# Patient Record
Sex: FEMALE | Employment: PART TIME | URBAN - METROPOLITAN AREA
[De-identification: names, ages, dates, MRNs, and addresses within clinical notes are randomized per-mention and may not be internally consistent; named-entity substitution may affect disease eponyms.]

---

## 2022-04-14 ENCOUNTER — TRANSCRIBE ORDER (OUTPATIENT)
Dept: SCHEDULING | Age: 27
End: 2022-04-14

## 2022-04-14 DIAGNOSIS — R22.9 SUBCUTANEOUS NODULE: Primary | ICD-10-CM

## 2022-04-15 ENCOUNTER — TRANSCRIBE ORDER (OUTPATIENT)
Dept: SCHEDULING | Age: 27
End: 2022-04-15

## 2022-04-15 DIAGNOSIS — R22.9 SUBCUTANEOUS NODULE: Primary | ICD-10-CM

## 2022-04-26 ENCOUNTER — HOSPITAL ENCOUNTER (OUTPATIENT)
Dept: ULTRASOUND IMAGING | Age: 27
Discharge: HOME | End: 2022-04-26
Attending: DERMATOLOGY
Payer: MEDICAID

## 2022-04-26 DIAGNOSIS — R22.9 SUBCUTANEOUS NODULE: ICD-10-CM

## 2022-04-26 PROCEDURE — 76882 US LMTD JT/FCL EVL NVASC XTR: CPT

## 2022-05-31 RX ORDER — SPIRONOLACTONE AND HYDROCHLOROTHIAZIDE 25; 25 MG/1; MG/1
1 TABLET ORAL DAILY
COMMUNITY
Start: 2022-02-01 | End: 2022-06-02

## 2022-05-31 RX ORDER — DEXTROAMPHETAMINE SACCHARATE, AMPHETAMINE ASPARTATE MONOHYDRATE, DEXTROAMPHETAMINE SULFATE AND AMPHETAMINE SULFATE 2.5; 2.5; 2.5; 2.5 MG/1; MG/1; MG/1; MG/1
10 CAPSULE, EXTENDED RELEASE ORAL EVERY MORNING
COMMUNITY
Start: 2022-04-06

## 2022-05-31 NOTE — PROGRESS NOTES
Subjective:     Chief Complaint   Patient presents with   [address] is a 27 y.o. F.  She has no significant past medical history outside of ADHD, acne, and use of weight loss medications. She comes in today for establishment. She says that she had been inspired to come in as her family have been recently dealing with numerous health issues. She herself is actually been feeling fine and has had no significant interval changes to her health over the past year. She continues to use Adderall XR 10 mg daily for her ADHD, as prescribed to her by her psychiatrist.  In addition, she continues on spironolactone, as prescribed to her by her dermatologist for her history of acne. Finally, she had previously been using phentermine/topiramate for her weight loss, although the phentermine had been discontinued when she started the Adderall, and she continues on topiramate instead without sedation or other problems. She gets the topiramate from a specialist.  Over the past year, her weight has been stable, and her medications are felt by her to be effective for controlling her symptoms. She had an IUD placed last month, and reports being up-to-date with regard to her Pap smear. Her review of systems is otherwise negative. Routine Healthcare Maintenance issues are reviewed and discussed with the patient as noted below. Orders to update gaps in healthcare maintenance were placed as noted below in the Assessment and Plan, where applicable. Past Medical History:  Past Medical History:   Diagnosis Date    ADHD     Anxiety     Eczema     Long-term current use of stimulant        Past Surgical Histor:  No past surgical history on file.     Allergies:  No Known Allergies    Medications:  Current Outpatient Medications   Medication Sig Dispense Refill    Dapsone 5 % gel dapsone 5 % topical gel      Junel 1/20, 21, 1-20 mg-mcg tablet TAKE 1 TABLET BY MOUTH EVERY DAY SKIPPING PLACEBO PILLS FOR CONTINUOUS HORMONAL DOSING      levonorgestreL (Kyleena) 17.5 mcg/24 hrs (5 yrs) 19.5 mg IUD IUD Kyleena 17.5 mcg/24 hrs (5yrs) 19.5mg intrauterine device   Take 1 device by intrauterine route.  topiramate (TOPAMAX) 25 mg tablet       tretinoin (RETIN-A) 0.05 % topical cream APPLY TO AFFECTED AREA OF THE FACE AT BEDTIME      spironolactone (ALDACTONE) 25 mg tablet Take 25 mg by mouth daily.  amphetamine-dextroamphetamine XR (ADDERALL XR) 10 mg XR capsule Take 10 mg by mouth Every morning. Social History:  Social History     Socioeconomic History    Marital status: SINGLE   Tobacco Use    Smoking status: Never Smoker    Smokeless tobacco: Never Used   Substance and Sexual Activity    Alcohol use: Yes     Comment: socially    Drug use: Never    Sexual activity: Yes     Social Determinants of Health     Physical Activity: Insufficiently Active    Days of Exercise per Week: 4 days    Minutes of Exercise per Session: 20 min       Family History:  No family history on file. Immunizations:  Immunization History   Administered Date(s) Administered    COVID-19, Moderna Booster, PF, 0.25mL Dose 09/07/2021, 10/02/2021    COVID-19, Moderna, Primary or Immunocompromised Series, MRNA, PF, 100mcg/0.5mL 03/23/2021, 04/21/2021    Hep B Vaccine (Adult) 01/11/2022    Influenza Vaccine (Quad) Mdck Pf (>2 Yrs Flucelvax QUAD [id number]) 10/14/2021    TB Skin Test (PPD) Intradermal 12/11/2021    Tdap 12/11/2021        Healthcare Maintenance:  Health Maintenance   Topic Date Due    Hepatitis C Screening  Never done    Depression Screen  Never done    HPV Age 9Y-34Y (1 - 2-dose series) Never done    Pap Smear  Never done    DTaP/Tdap/Td series (2 - Td or Tdap) 12/11/2031    Flu Vaccine  Completed    COVID-19 Vaccine  Completed    Pneumococcal 0-64 years  Aged Out        Review of Systems:  ROS:  Review of Systems   Constitutional: Negative. HENT: Negative. Eyes: Negative.     Respiratory: Negative. Cardiovascular: Negative. Gastrointestinal: Negative. Genitourinary: Negative. Musculoskeletal: Negative. Skin: Negative. Neurological: Negative. Endo/Heme/Allergies: Negative. Psychiatric/Behavioral: Negative. ROS otherwise negative      Objective:     Vital Signs:  Visit Vitals  BP 98/65 (BP 1 Location: Left upper arm, BP Patient Position: Sitting, BP Cuff Size: Adult)   Pulse 68   Temp 98.7 °F (37.1 °C) (Oral)   Ht 5' 6\" (1.676 m)   Wt 159 lb 4.8 oz (72.3 kg)   LMP 05/21/2022   SpO2 98%   BMI 25.71 kg/m²       BMI:  Body mass index is 25.71 kg/m². Physical Examination:  Physical Exam  Constitutional:       Appearance: Normal appearance. She is normal weight. HENT:      Head: Normocephalic and atraumatic. Right Ear: External ear normal.      Left Ear: External ear normal.      Nose: Nose normal.      Mouth/Throat:      Mouth: Mucous membranes are moist.      Pharynx: Oropharynx is clear. No oropharyngeal exudate or posterior oropharyngeal erythema. Cardiovascular:      Rate and Rhythm: Normal rate and regular rhythm. Pulses: Normal pulses. Heart sounds: Normal heart sounds. No murmur heard. No friction rub. No gallop. Pulmonary:      Effort: Pulmonary effort is normal. No respiratory distress. Breath sounds: Normal breath sounds. No wheezing, rhonchi or rales. Abdominal:      General: Abdomen is flat. Bowel sounds are normal. There is no distension. Palpations: Abdomen is soft. Tenderness: There is no abdominal tenderness. There is no guarding. Musculoskeletal:         General: No swelling, tenderness or deformity. Normal range of motion. Cervical back: Normal range of motion and neck supple. No rigidity or tenderness. Skin:     General: Skin is warm and dry. Findings: No erythema, lesion or rash. Neurological:      General: No focal deficit present.       Mental Status: She is alert and oriented to person, place, and time. Mental status is at baseline. Sensory: No sensory deficit. Motor: No weakness. Gait: Gait normal.      Deep Tendon Reflexes: Reflexes normal.   Psychiatric:         Mood and Affect: Mood normal.         Behavior: Behavior normal.         Judgment: Judgment normal.          Physical exam otherwise negative    Diagnostic Testing:    Laboratory Studies:  No visits with results within 1 Year(s) from this visit. Latest known visit with results is:   No results found for any previous visit. Radiographic Studies:  XR Results (most recent):  No results found for this or any previous visit. ROHIT Results (most recent):  No results found for this or any previous visit. CT Results (most recent):  No results found for this or any previous visit. DEXA Results (most recent):  No results found for this or any previous visit. MRI Results (most recent):  No results found for this or any previous visit. Assessment/Plan:       ICD-10-CM ICD-9-CM    1. Routine adult health maintenance  Z00.00 V70.0 HEMOGLOBIN A1C WITH EAG      CBC WITH AUTOMATED DIFF      METABOLIC PANEL, COMPREHENSIVE      LIPID PANEL   2. Screening for viral disease  Z11.59 V73.99 HIV 1/2 AG/AB, 4TH GENERATION,W RFLX CONFIRM      HEPATITIS C AB   3. ADHD (attention deficit hyperactivity disorder), combined type  F90.2 314.01    4. Weight loss due to medication  R63.4 783.21     T50.905A E947.9    5. Acne, unspecified acne type  L70.9 706.1           Healthcare Maintenance:  - Preventive measures are reviewed as per above  - Up to date on routine interventions except as noted above  - Orders placed to update gaps as noted  - Notes: Fasting laboratory studies ordered. HCV, HIV ordered. She is up-to-date with regard to her Pap smear by her report. Acne:  - Using spironolactone. Usually has her electrolytes checked by her dermatologist.  Reassessing renal function as noted above.  Continue followup with Dermatology. ADHD:  - On Adderall. Continue follow-up with psychiatry. Weight loss medication use:  - On topiramate. Checking LFTs, CBC. Continuing otherwise with current medication regimen as per specialty prescription. Follow-up and Dispositions    · Return in about 1 year (around 6/2/2023). Andrew Chang MD    Please note that this dictation was completed with [employer], the computer voice recognition software. Quite often unanticipated grammatical, syntax, homophones, and other interpretive errors are inadvertently transcribed by the computer software. Please disregard these errors. Please excuse any errors that have escaped final proofreading.

## 2022-06-02 ENCOUNTER — OFFICE VISIT (OUTPATIENT)
Dept: INTERNAL MEDICINE CLINIC | Age: 27
End: 2022-06-02
Payer: MEDICAID

## 2022-06-02 VITALS
OXYGEN SATURATION: 98 % | HEART RATE: 68 BPM | HEIGHT: 66 IN | SYSTOLIC BLOOD PRESSURE: 98 MMHG | DIASTOLIC BLOOD PRESSURE: 65 MMHG | WEIGHT: 159.3 LBS | BODY MASS INDEX: 25.6 KG/M2 | TEMPERATURE: 98.7 F

## 2022-06-02 DIAGNOSIS — Z00.00 ROUTINE ADULT HEALTH MAINTENANCE: Primary | ICD-10-CM

## 2022-06-02 DIAGNOSIS — L70.9 ACNE, UNSPECIFIED ACNE TYPE: ICD-10-CM

## 2022-06-02 DIAGNOSIS — T50.905A WEIGHT LOSS DUE TO MEDICATION: ICD-10-CM

## 2022-06-02 DIAGNOSIS — F90.2 ADHD (ATTENTION DEFICIT HYPERACTIVITY DISORDER), COMBINED TYPE: ICD-10-CM

## 2022-06-02 DIAGNOSIS — Z11.59 SCREENING FOR VIRAL DISEASE: ICD-10-CM

## 2022-06-02 DIAGNOSIS — R63.4 WEIGHT LOSS DUE TO MEDICATION: ICD-10-CM

## 2022-06-02 PROBLEM — M67.431 GANGLION CYST OF DORSUM OF RIGHT WRIST: Status: ACTIVE | Noted: 2022-06-02

## 2022-06-02 PROBLEM — D17.22 LIPOMA OF LEFT AXILLA: Status: ACTIVE | Noted: 2022-06-02

## 2022-06-02 PROCEDURE — 99385 PREV VISIT NEW AGE 18-39: CPT | Performed by: INTERNAL MEDICINE

## 2022-06-02 RX ORDER — DAPSONE 50 MG/G
GEL TOPICAL
COMMUNITY

## 2022-06-02 RX ORDER — TRETINOIN 0.5 MG/G
CREAM TOPICAL
COMMUNITY
Start: 2022-05-13

## 2022-06-02 RX ORDER — TOPIRAMATE 25 MG/1
TABLET ORAL
COMMUNITY
Start: 2021-08-01

## 2022-06-02 RX ORDER — NORETHINDRONE ACETATE AND ETHINYL ESTRADIOL 1; 20 MG/1; UG/1
TABLET ORAL
COMMUNITY
Start: 2022-03-25

## 2022-06-02 RX ORDER — LEVONORGESTREL 19.5 MG/1
INTRAUTERINE DEVICE INTRAUTERINE
COMMUNITY

## 2022-06-02 RX ORDER — SPIRONOLACTONE 25 MG/1
25 TABLET ORAL DAILY
COMMUNITY
Start: 2022-02-13

## 2022-06-02 RX ORDER — PHENTERMINE HYDROCHLORIDE 37.5 MG/1
TABLET ORAL
COMMUNITY
Start: 2021-09-28 | End: 2022-06-02

## 2022-06-02 NOTE — PATIENT INSTRUCTIONS
Learning About the [facility] Diet  What is the Mediterranean diet? The Mediterranean diet is a style of eating rather than a diet plan. It features foods eaten in [place], [place], [place] and [place], and other countries along the [facility]. It emphasizes eating foods like fish, fruits, vegetables, beans, high-fiber breads and whole grains, nuts, and olive oil. This style of eating includes limited red meat, cheese, and sweets. Why choose the Mediterranean diet? A Mediterranean-style diet may improve heart health. It contains more fat than other heart-healthy diets. But the fats are mainly from nuts, unsaturated oils (such as fish oils and olive oil), and certain nut or seed oils (such as canola, soybean, or flaxseed oil). These fats may help protect the heart and blood vessels. How can you get started on the Mediterranean diet? Here are some things you can do to switch to a more Mediterranean way of eating. What to eat  · Eat a variety of fruits and vegetables each day, such as grapes, blueberries, tomatoes, broccoli, peppers, figs, olives, spinach, eggplant, beans, lentils, and chickpeas. · Eat a variety of whole-grain foods each day, such as oats, brown rice, and whole wheat bread, pasta, and couscous. · Eat fish at least 2 times a week. Try tuna, salmon, mackerel, lake trout, herring, or sardines. · Eat moderate amounts of low-fat dairy products, such as milk, cheese, or yogurt. · Eat moderate amounts of poultry and eggs. · Choose healthy (unsaturated) fats, such as nuts, olive oil, and certain nut or seed oils like canola, soybean, and flaxseed. · Limit unhealthy (saturated) fats, such as butter, palm oil, and coconut oil. And limit fats found in animal products, such as meat and dairy products made with whole milk. Try to eat red meat only a few times a month in very small amounts. · Limit sweets and desserts to only a few times a week.  This includes sugar-sweetened drinks like soda. The Mediterranean diet may also include red wine with your meal1 glass each day for women and up to 2 glasses a day for men. Tips for eating at home  · Use herbs, spices, garlic, lemon zest, and citrus juice instead of salt to add flavor to foods. · Add avocado slices to your sandwich instead of quiros. · Have fish for lunch or dinner instead of red meat. Brush the fish with olive oil, and broil or grill it. · Sprinkle your salad with seeds or nuts instead of cheese. · Cook with olive or canola oil instead of butter or oils that are high in saturated fat. · Switch from 2% milk or whole milk to 1% or fat-free milk. · Dip raw vegetables in a vinaigrette dressing or hummus instead of dips made from mayonnaise or sour cream.  · Have a piece of fruit for dessert instead of a piece of cake. Try baked apples, or have some dried fruit. Tips for eating out  · Try broiled, grilled, baked, or poached fish instead of having it fried or breaded. · Ask your  to have your meals prepared with olive oil instead of butter. · Order dishes made with marinara sauce or sauces made from olive oil. Avoid sauces made from cream or mayonnaise. · Choose whole-grain breads, whole wheat pasta and pizza crust, brown rice, beans, and lentils. · Cut back on butter or margarine on bread. Instead, you can dip your bread in a small amount of olive oil. · Ask for a side salad or grilled vegetables instead of french fries or chips. Where can you learn more? Go to http://bisi-myrna.info/  Enter O407 in the search box to learn more about \"Learning About the Mediterranean Diet. \"  Current as of: September 8, 2021               Content Version: 13.2  © 9404-1518 [employer]. Care instructions adapted under license by [employer] (which disclaims liability or warranty for this information).  If you have questions about a medical condition or this instruction, always ask your healthcare professional. [address] any warranty or liability for your use of this information.

## 2022-06-02 NOTE — PROGRESS NOTES
Chief Complaint   Patient presents with   [facility]       1. \"Have you been to the ER, urgent care clinic since your last visit? Hospitalized since your last visit? \" No    2. \"Have you seen or consulted any other health care providers outside of the [address] [place] [address] since your last visit? \" No     3. For patients aged 39-70: Has the patient had a colonoscopy / FIT/ Cologuard? No      If the patient is female:    4. For patients aged 41-77: Has the patient had a mammogram within the past 2 years? No      5. For patients aged 21-65: Has the patient had a pap smear?  No

## 2022-06-16 ENCOUNTER — LAB ONLY (OUTPATIENT)
Dept: INTERNAL MEDICINE CLINIC | Age: 27
End: 2022-06-16

## 2022-06-16 DIAGNOSIS — Z00.00 ROUTINE ADULT HEALTH MAINTENANCE: ICD-10-CM

## 2022-06-16 DIAGNOSIS — Z11.59 SCREENING FOR VIRAL DISEASE: ICD-10-CM

## 2022-06-17 LAB
ALBUMIN SERPL-MCNC: 4.6 G/DL (ref 3.9–5)
ALBUMIN/GLOB SERPL: 1.8 {RATIO} (ref 1.2–2.2)
ALP SERPL-CCNC: 41 IU/L (ref 44–121)
ALT SERPL-CCNC: 10 IU/L (ref 0–32)
AST SERPL-CCNC: 15 IU/L (ref 0–40)
BASOPHILS # BLD AUTO: 0 X10E3/UL (ref 0–0.2)
BASOPHILS NFR BLD AUTO: 1 %
BILIRUB SERPL-MCNC: 0.4 MG/DL (ref 0–1.2)
BUN SERPL-MCNC: 13 MG/DL (ref 6–20)
BUN/CREAT SERPL: 17 (ref 9–23)
CALCIUM SERPL-MCNC: 9.7 MG/DL (ref 8.7–10.2)
CHLORIDE SERPL-SCNC: 106 MMOL/L (ref 96–106)
CHOLEST SERPL-MCNC: 168 MG/DL (ref 100–199)
CO2 SERPL-SCNC: 24 MMOL/L (ref 20–29)
CREAT SERPL-MCNC: 0.77 MG/DL (ref 0.57–1)
EGFR: 110 ML/MIN/1.73
EOSINOPHIL # BLD AUTO: 0.1 X10E3/UL (ref 0–0.4)
EOSINOPHIL NFR BLD AUTO: 2 %
ERYTHROCYTE [DISTWIDTH] IN BLOOD BY AUTOMATED COUNT: 12.2 % (ref 11.7–15.4)
EST. AVERAGE GLUCOSE BLD GHB EST-MCNC: 97 MG/DL
GLOBULIN SER CALC-MCNC: 2.5 G/DL (ref 1.5–4.5)
GLUCOSE SERPL-MCNC: 91 MG/DL (ref 65–99)
HBA1C MFR BLD: 5 % (ref 4.8–5.6)
HCT VFR BLD AUTO: 39.9 % (ref 34–46.6)
HCV AB S/CO SERPL IA: <0.1 S/CO RATIO (ref 0–0.9)
HDLC SERPL-MCNC: 59 MG/DL
HGB BLD-MCNC: 12.4 G/DL (ref 11.1–15.9)
HIV 1+2 AB+HIV1 P24 AG SERPL QL IA: NON REACTIVE
IMM GRANULOCYTES # BLD AUTO: 0 X10E3/UL (ref 0–0.1)
IMM GRANULOCYTES NFR BLD AUTO: 0 %
LDLC SERPL CALC-MCNC: 94 MG/DL (ref 0–99)
LYMPHOCYTES # BLD AUTO: 2.1 X10E3/UL (ref 0.7–3.1)
LYMPHOCYTES NFR BLD AUTO: 38 %
MCH RBC QN AUTO: 25.1 PG (ref 26.6–33)
MCHC RBC AUTO-ENTMCNC: 31.1 G/DL (ref 31.5–35.7)
MCV RBC AUTO: 81 FL (ref 79–97)
MONOCYTES # BLD AUTO: 0.4 X10E3/UL (ref 0.1–0.9)
MONOCYTES NFR BLD AUTO: 7 %
NEUTROPHILS # BLD AUTO: 2.8 X10E3/UL (ref 1.4–7)
NEUTROPHILS NFR BLD AUTO: 52 %
PLATELET # BLD AUTO: 230 X10E3/UL (ref 150–450)
POTASSIUM SERPL-SCNC: 5.5 MMOL/L (ref 3.5–5.2)
PROT SERPL-MCNC: 7.1 G/DL (ref 6–8.5)
RBC # BLD AUTO: 4.94 X10E6/UL (ref 3.77–5.28)
SODIUM SERPL-SCNC: 142 MMOL/L (ref 134–144)
TRIGL SERPL-MCNC: 80 MG/DL (ref 0–149)
VLDLC SERPL CALC-MCNC: 15 MG/DL (ref 5–40)
WBC # BLD AUTO: 5.4 X10E3/UL (ref 3.4–10.8)

## 2023-03-28 ENCOUNTER — OFFICE VISIT (OUTPATIENT)
Dept: INTERNAL MEDICINE CLINIC | Age: 28
End: 2023-03-28
Payer: MEDICAID

## 2023-03-28 VITALS
WEIGHT: 143 LBS | RESPIRATION RATE: 18 BRPM | DIASTOLIC BLOOD PRESSURE: 80 MMHG | SYSTOLIC BLOOD PRESSURE: 118 MMHG | HEART RATE: 73 BPM | BODY MASS INDEX: 22.98 KG/M2 | HEIGHT: 66 IN | OXYGEN SATURATION: 100 %

## 2023-03-28 DIAGNOSIS — K58.2 IRRITABLE BOWEL SYNDROME WITH BOTH CONSTIPATION AND DIARRHEA: Primary | ICD-10-CM

## 2023-03-28 DIAGNOSIS — Z51.81 THERAPEUTIC DRUG MONITORING: ICD-10-CM

## 2023-03-28 DIAGNOSIS — L70.0 ACNE VULGARIS: ICD-10-CM

## 2023-03-28 DIAGNOSIS — F90.2 ADHD (ATTENTION DEFICIT HYPERACTIVITY DISORDER), COMBINED TYPE: ICD-10-CM

## 2023-03-28 DIAGNOSIS — E87.5 HYPERKALEMIA: ICD-10-CM

## 2023-03-28 PROCEDURE — 99213 OFFICE O/P EST LOW 20 MIN: CPT | Performed by: INTERNAL MEDICINE

## 2023-03-28 RX ORDER — DEXTROAMPHETAMINE SACCHARATE, AMPHETAMINE ASPARTATE MONOHYDRATE, DEXTROAMPHETAMINE SULFATE AND AMPHETAMINE SULFATE 3.75; 3.75; 3.75; 3.75 MG/1; MG/1; MG/1; MG/1
CAPSULE, EXTENDED RELEASE ORAL
COMMUNITY
Start: 2023-02-15

## 2023-03-28 NOTE — PROGRESS NOTES
Chief Complaint   Patient presents with    Shoulder Injury     Numbness on hand and IBS     Visit Vitals  /80 (BP 1 Location: Left upper arm, BP Patient Position: Sitting, BP Cuff Size: Adult)   Pulse 73   Resp 18   Ht 5' 6\" (1.676 m)   Wt 143 lb (64.9 kg)   SpO2 100%   BMI 23.08 kg/m²     1. \"Have you been to the ER, urgent care clinic since your last visit? Hospitalized since your last visit? \" No    2. \"Have you seen or consulted any other health care providers outside of the [address] [place] [address] since your last visit? \" No     3. For patients aged 39-70: Has the patient had a colonoscopy / FIT/ Cologuard? No      If the patient is female:    4. For patients aged 41-77: Has the patient had a mammogram within the past 2 years? No      5. For patients aged 21-65: Has the patient had a pap smear?  No

## 2023-03-28 NOTE — PROGRESS NOTES
ICD-10-CM ICD-9-CM    1. Irritable bowel syndrome with both constipation and diarrhea  K58.2 564.1       2. ADHD (attention deficit hyperactivity disorder), combined type  F90.2 314.01       3. Hyperkalemia  P83.9 340.5 METABOLIC PANEL, COMPREHENSIVE      CANCELED: RENAL FUNCTION PANEL      4. Therapeutic drug monitoring  D39.39 H66.77 METABOLIC PANEL, COMPREHENSIVE      5. Acne vulgaris  L70.0 706.1                Subjective:     Chief Complaint   Patient presents with    Shoulder Injury     Numbness on hand and IBS       Ozzie Rios is a 24 y.o. F.  she  has a past medical history of Acne vulgaris, ADHD, Anxiety, Eczema, Irritable bowel syndrome, and Long-term current use of stimulant. her last appointment in this clinic was 6/2/2022 . I reviewed and updated the medical record. This is a very pleasant 46-year-old white female with past medical history of acne vulgaris who presents today for routine follow-up. She reports feeling generally okay overall today but has some questions. Irritable bowel syndrome: Over the past year or so, the patient has had intermittent abdominal discomfort associate with bloating, that is typically relieved with flatus and with bowel movements. She describes intermittent constipation with this as well. 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 She denies having had any hematochezia or melena during this time. She has lost weight, but this has been intentional, as she continues on weight loss medications. She denies having had any fevers or chills, or any further constitutional or systemic complaints. She has never had any family history of inflammatory bowel disease, or any other family history of colon cancer. She notes that she had gone on a low FODMAP diet inadvertently, and had good results with this, with subsequent improvement in her symptoms.   She wishes to know today if additional evaluation is necessary. She otherwise has been feeling fine, noting that the abdominal discomfort that she had before is largely resolved. Sinus arrhythmia: She reports that her father recently had been diagnosed with atrial fibrillation, and wonders if she might be at risk for this as she had a sinus arrhythmia on previous examination. She has had no palpitations, shortness of breath, or any further cardiopulmonary concerns. No changes in exercise tolerance overall. No other family history of any cardiovascular or cerebrovascular disease. Neuropathy: The patient says that over the past 2 days, she has had a patch of numbness and diminished sensation on the back of her hand on the right side. She denies having had any antecedent injury to the hand, shoulder, elbow, or other problems. She has had no other numbness or tingling at other areas. Overall, she has maintained most sensation in the hand but says that \"things just feel off\" in the small area on the back of her hand but that this is improving on its own at this time. No other constitutional systemic complaints. Review of systems otherwise negative. Routine Healthcare Maintenance issues are reviewed and discussed with the patient as noted below. Orders to update gaps in healthcare maintenance were placed as noted below in the Assessment and Plan, where applicable. Past Medical History:  Past Medical History:   Diagnosis Date    Acne vulgaris     ADHD     Anxiety     Eczema     Irritable bowel syndrome     Long-term current use of stimulant        Past Surgical Histor:  History reviewed. No pertinent surgical history. Allergies:  No Known Allergies    Medications:  Current Outpatient Medications   Medication Sig Dispense Refill    amphetamine-dextroamphetamine XR (ADDERALL XR) 15 mg XR capsule TAKE 1 CAPSULE BY MOUTH ONCE DAILY IN THE MORNING DO NOT CRUSH OR CHEW      FIBER-CAPS, PSYLLIUM HUSK, PO Take  by mouth.       ASHWAGANDHA EXTRACT PO Take  by mouth. Dapsone 5 % gel dapsone 5 % topical gel      Junel 1/20, 21, 1-20 mg-mcg tablet TAKE 1 TABLET BY MOUTH EVERY DAY SKIPPING PLACEBO PILLS FOR CONTINUOUS HORMONAL DOSING      levonorgestreL (Kyleena) 17.5 mcg/24 hrs (5 yrs) 19.5 mg IUD IUD Kyleena 17.5 mcg/24 hrs (5yrs) 19.5mg intrauterine device   Take 1 device by intrauterine route. topiramate (TOPAMAX) 25 mg tablet       tretinoin (RETIN-A) 0.05 % topical cream APPLY TO AFFECTED AREA OF THE FACE AT BEDTIME      spironolactone (ALDACTONE) 25 mg tablet Take 25 mg by mouth daily. Social History:  Social History     Socioeconomic History    Marital status: SINGLE   Tobacco Use    Smoking status: Never    Smokeless tobacco: Never   Substance and Sexual Activity    Alcohol use: Yes     Comment: socially    Drug use: Never    Sexual activity: Yes     Social Determinants of Health     Physical Activity: Insufficiently Active    Days of Exercise per Week: 4 days    Minutes of Exercise per Session: 20 min       Family History:  History reviewed. No pertinent family history.     Immunizations:  Immunization History   Administered Date(s) Administered    COVID-19, MODERNA [place] border, Primary or Immunocompromised, (age 18y+), IM, 100 mcg/0.5mL 03/23/2021, 04/21/2021    COVID-19, MODERNA Booster BLUE border, (age 18y+), IM, 50mcg/0.25mL 09/07/2021, 10/02/2021    Hep B Vaccine (Adult) 01/11/2022    Influenza, FLUCELVAX, (age 10 mo+), MDCK, PF 10/14/2021    TB Skin Test (PPD) Intradermal 12/11/2021    Tdap 12/11/2021        Healthcare Maintenance:  Health Maintenance   Topic Date Due    Varicella Vaccine (1 of 2 - 2-dose childhood series) Never done    HPV Age 9Y-34Y (1 - 2-dose series) Never done    Pap Smear  Never done    COVID-19 Vaccine (5 - Booster for Moderna series) 11/27/2021    Flu Vaccine (1) 08/01/2022    Medicare Yearly Exam  03/27/2023    Depression Screen  06/02/2023    DTaP/Tdap/Td series (2 - Td or Tdap) 12/11/2031 Hepatitis C Screening  Completed    Pneumococcal 0-64 years  Aged Out        Review of Systems:  ROS:  Review of Systems   Constitutional: Negative. HENT: Negative. Eyes: Negative. Respiratory: Negative. Cardiovascular: Negative. Gastrointestinal: Negative. Genitourinary: Negative. Musculoskeletal: Negative. Skin: Negative. Neurological: Negative. Endo/Heme/Allergies: Negative. Psychiatric/Behavioral: Negative. ROS otherwise negative      Objective:     Vital Signs:  Visit Vitals  /80 (BP 1 Location: Left upper arm, BP Patient Position: Sitting, BP Cuff Size: Adult)   Pulse 73   Resp 18   Ht 5' 6\" (1.676 m)   Wt 143 lb (64.9 kg)   SpO2 100%   BMI 23.08 kg/m²       BMI:  Body mass index is 23.08 kg/m². Physical Examination:  Physical Exam  Constitutional:       Appearance: Normal appearance. She is normal weight. HENT:      Head: Normocephalic and atraumatic. Right Ear: External ear normal.      Left Ear: External ear normal.      Nose: Nose normal.      Mouth/Throat:      Mouth: Mucous membranes are moist.      Pharynx: Oropharynx is clear. No oropharyngeal exudate or posterior oropharyngeal erythema. Cardiovascular:      Rate and Rhythm: Normal rate and regular rhythm. Pulses: Normal pulses. Heart sounds: Normal heart sounds. No murmur heard. No friction rub. No gallop. Pulmonary:      Effort: Pulmonary effort is normal. No respiratory distress. Breath sounds: Normal breath sounds. No wheezing, rhonchi or rales. Abdominal:      General: Abdomen is flat. Bowel sounds are normal. There is no distension. Palpations: Abdomen is soft. Tenderness: There is no abdominal tenderness. There is no guarding. Musculoskeletal:         General: No swelling, tenderness or deformity. Normal range of motion. Cervical back: Normal range of motion and neck supple. No rigidity or tenderness. Skin:     General: Skin is warm and dry. Findings: No erythema, lesion or rash. Neurological:      General: No focal deficit present. Mental Status: She is alert and oriented to person, place, and time. Mental status is at baseline. Sensory: No sensory deficit. Motor: No weakness. Gait: Gait normal.      Deep Tendon Reflexes: Reflexes normal.   Psychiatric:         Mood and Affect: Mood normal.         Behavior: Behavior normal.         Judgment: Judgment normal.        Physical exam otherwise negative    Diagnostic Testing:    Laboratory Studies:  No visits with results within 6 Month(s) from this visit. Latest known visit with results is:   Lab Only on 06/16/2022   Component Date Value Ref Range Status    Hemoglobin A1c 06/16/2022 5.0  4.8 - 5.6 % Final    Comment:          Prediabetes: 5.7 - 6.4           Diabetes: >6.4           Glycemic control for adults with diabetes: <7.0      Estimated average glucose 06/16/2022 97  mg/dL Final    WBC 06/16/2022 5.4  3.4 - 10.8 x10E3/uL Final    RBC 06/16/2022 4.94  3.77 - 5.28 x10E6/uL Final    HGB 06/16/2022 12.4  11.1 - 15.9 g/dL Final    HCT 06/16/2022 39.9  34.0 - 46.6 % Final    MCV 06/16/2022 81  79 - 97 fL Final    MCH 06/16/2022 25.1 (A)  26.6 - 33.0 pg Final    MCHC 06/16/2022 31.1 (A)  31.5 - 35.7 g/dL Final    RDW 06/16/2022 12.2  11.7 - 15.4 % Final    PLATELET 66/37/5401 697  150 - 450 x10E3/uL Final    NEUTROPHILS 06/16/2022 52  Not Estab. % Final    Lymphocytes 06/16/2022 38  Not Estab. % Final    MONOCYTES 06/16/2022 7  Not Estab. % Final    EOSINOPHILS 06/16/2022 2  Not Estab. % Final    BASOPHILS 06/16/2022 1  Not Estab. % Final    ABS. NEUTROPHILS 06/16/2022 2.8  1.4 - 7.0 x10E3/uL Final    Abs Lymphocytes 06/16/2022 2.1  0.7 - 3.1 x10E3/uL Final    ABS. MONOCYTES 06/16/2022 0.4  0.1 - 0.9 x10E3/uL Final    ABS. EOSINOPHILS 06/16/2022 0.1  0.0 - 0.4 x10E3/uL Final    ABS. BASOPHILS 06/16/2022 0.0  0.0 - 0.2 x10E3/uL Final    IMMATURE GRANULOCYTES 06/16/2022 0  Not Estab. % Final    ABS. IMM. GRANS. 06/16/2022 0.0  0.0 - 0.1 x10E3/uL Final    Glucose 06/16/2022 91  65 - 99 mg/dL Final    BUN 06/16/2022 13  6 - 20 mg/dL Final    Creatinine 06/16/2022 0.77  0.57 - 1.00 mg/dL Final    eGFR 06/16/2022 110  >59 mL/min/1.73 Final    BUN/Creatinine ratio 06/16/2022 17  9 - 23 Final    Sodium 06/16/2022 142  134 - 144 mmol/L Final    Potassium 06/16/2022 5.5 (A)  3.5 - 5.2 mmol/L Final    Chloride 06/16/2022 106  96 - 106 mmol/L Final    CO2 06/16/2022 24  20 - 29 mmol/L Final    Calcium 06/16/2022 9.7  8.7 - 10.2 mg/dL Final    Protein, total 06/16/2022 7.1  6.0 - 8.5 g/dL Final    Albumin 06/16/2022 4.6  3.9 - 5.0 g/dL Final    GLOBULIN, TOTAL 06/16/2022 2.5  1.5 - 4.5 g/dL Final    A-G Ratio 06/16/2022 1.8  1.2 - 2.2 Final    Bilirubin, total 06/16/2022 0.4  0.0 - 1.2 mg/dL Final    Alk. phosphatase 06/16/2022 41 (A)  44 - 121 IU/L Final    AST (SGOT) 06/16/2022 15  0 - 40 IU/L Final    ALT (SGPT) 06/16/2022 10  0 - 32 IU/L Final    Cholesterol, total 06/16/2022 168  100 - 199 mg/dL Final    Triglyceride 06/16/2022 80  0 - 149 mg/dL Final    HDL Cholesterol 06/16/2022 59  >39 mg/dL Final    VLDL, calculated 06/16/2022 15  5 - 40 mg/dL Final    LDL, calculated 06/16/2022 94  0 - 99 mg/dL Final    HIV SCREEN 4TH GENERATION [facility] 06/16/2022 Non Reactive  Non Reactive Final    Comment: HIV Negative  HIV-1/HIV-2 antibodies and HIV-1 p24 antigen were NOT detected. There is no laboratory evidence of HIV infection. Hep C Virus Ab 06/16/2022 <0.1  0.0 - 0.9 s/co ratio Final    Comment:                                   Negative:     < 0.8                               Indeterminate: 0.8 - 0.9                                    Positive:     > 0.9   HCV antibody alone does not differentiate between   previous resolved infection and active infection.    The CDC and current clinical guidelines recommend   that a positive HCV antibody result be followed up   with an HCV RNA test to support the diagnosis of   acute HCV infection. [facility] offers Hepatitis C   Virus (HCV) RNA, Diagnosis, BESSIE ([id number]) and   Hepatitis C Virus (HCV) Antibody with reflex to   Quantitative Real-time PCR ([id number]). Radiographic Studies:  XR Results (most recent):  No results found for this or any previous visit. ROHIT Results (most recent):  No results found for this or any previous visit. CT Results (most recent):  No results found for this or any previous visit. DEXA Results (most recent):  No results found for this or any previous visit. MRI Results (most recent):  No results found for this or any previous visit. Assessment/Plan:       ICD-10-CM ICD-9-CM    1. Irritable bowel syndrome with both constipation and diarrhea  K58.2 564.1       2. ADHD (attention deficit hyperactivity disorder), combined type  F90.2 314.01       3. Hyperkalemia  D00.3 512.9 METABOLIC PANEL, COMPREHENSIVE      CANCELED: RENAL FUNCTION PANEL      4. Therapeutic drug monitoring  D03.97 L29.63 METABOLIC PANEL, COMPREHENSIVE      5. Acne vulgaris  L70.0 706.1              Irritable bowel syndrome:  - Suspected based upon history, physical examination today  - Advised low FODMAP diet, consider referral to gastroenterology should symptoms progress, no clinical suspicion at this point for inflammatory bowel disease, though low threshold for considering additional diagnostic evaluation with colonoscopy, laboratory studies should her symptoms worsen  - Return precautions discussed with patient who endorses agreement and understanding    ADHD:  - She continues to get Adderall at the direction of her psychiatrist.  No changes to medication regimen today as this otherwise appears to be effective for her. Ongoing use of weight loss medication:  - If she continues get topiramate at the direction of the weight loss center.   I advised the patient that her BMI today is normal, and that additional weight loss would probably not be advisable. She endorses agreement and understanding, but wishes to continue with topiramate as it appears to be helping her with her headaches. Monitoring labs ordered. I have reviewed the patient's medical history in detail and updated the computerized patient record. We had a prolonged discussion about these complex clinical issues and went over the various important aspects to consider. All questions were answered. Advised the patient to call back or return to office if symptoms do not improve, change in nature, or persist.     The patient was given an after visit summary or informed of MyChart Access which includes patient instructions, diagnoses, current medications, & vitals. she expressed understanding with the diagnosis and plan. Delgado Rivera MD    Please note that this dictation was completed with [employer], the computer voice recognition software. Quite often unanticipated grammatical, syntax, homophones, and other interpretive errors are inadvertently transcribed by the computer software. Please disregard these errors. Please excuse any errors that have escaped final proofreading.

## 2023-03-29 LAB
ALBUMIN SERPL-MCNC: 4.7 G/DL (ref 3.9–5)
ALBUMIN/GLOB SERPL: 1.8 {RATIO} (ref 1.2–2.2)
ALP SERPL-CCNC: 44 IU/L (ref 44–121)
ALT SERPL-CCNC: 10 IU/L (ref 0–32)
AST SERPL-CCNC: 15 IU/L (ref 0–40)
BILIRUB SERPL-MCNC: 0.4 MG/DL (ref 0–1.2)
BUN SERPL-MCNC: 9 MG/DL (ref 6–20)
BUN/CREAT SERPL: 10 (ref 9–23)
CALCIUM SERPL-MCNC: 9.6 MG/DL (ref 8.7–10.2)
CHLORIDE SERPL-SCNC: 106 MMOL/L (ref 96–106)
CO2 SERPL-SCNC: 20 MMOL/L (ref 20–29)
CREAT SERPL-MCNC: 0.94 MG/DL (ref 0.57–1)
EGFRCR SERPLBLD CKD-EPI 2021: 86 ML/MIN/1.73
GLOBULIN SER CALC-MCNC: 2.6 G/DL (ref 1.5–4.5)
GLUCOSE SERPL-MCNC: 101 MG/DL (ref 70–99)
POTASSIUM SERPL-SCNC: 4.3 MMOL/L (ref 3.5–5.2)
PROT SERPL-MCNC: 7.3 G/DL (ref 6–8.5)
SODIUM SERPL-SCNC: 138 MMOL/L (ref 134–144)

## 2023-05-20 RX ORDER — LEVONORGESTREL 19.5 MG/1
INTRAUTERINE DEVICE INTRAUTERINE
COMMUNITY

## 2023-05-20 RX ORDER — NORETHINDRONE ACETATE AND ETHINYL ESTRADIOL 1; .02 MG/1; MG/1
TABLET ORAL
COMMUNITY
Start: 2022-03-25

## 2023-05-20 RX ORDER — TRETINOIN 0.5 MG/G
CREAM TOPICAL
COMMUNITY
Start: 2022-05-13

## 2023-05-20 RX ORDER — DEXTROAMPHETAMINE SACCHARATE, AMPHETAMINE ASPARTATE MONOHYDRATE, DEXTROAMPHETAMINE SULFATE AND AMPHETAMINE SULFATE 3.75; 3.75; 3.75; 3.75 MG/1; MG/1; MG/1; MG/1
CAPSULE, EXTENDED RELEASE ORAL
COMMUNITY
Start: 2023-02-15

## 2023-05-20 RX ORDER — TOPIRAMATE 25 MG/1
50 TABLET ORAL NIGHTLY
COMMUNITY
Start: 2021-08-01

## 2023-05-20 RX ORDER — DAPSONE 50 MG/G
GEL TOPICAL
COMMUNITY

## 2023-05-20 RX ORDER — SPIRONOLACTONE 25 MG/1
25 TABLET ORAL DAILY
COMMUNITY
Start: 2022-02-13

## 2023-07-07 ENCOUNTER — OFFICE VISIT (OUTPATIENT)
Facility: CLINIC | Age: 28
End: 2023-07-07
Payer: MEDICAID

## 2023-07-07 VITALS
RESPIRATION RATE: 16 BRPM | OXYGEN SATURATION: 99 % | BODY MASS INDEX: 25.09 KG/M2 | HEIGHT: 66 IN | WEIGHT: 156.1 LBS | DIASTOLIC BLOOD PRESSURE: 74 MMHG | SYSTOLIC BLOOD PRESSURE: 118 MMHG | TEMPERATURE: 98.4 F | HEART RATE: 82 BPM

## 2023-07-07 DIAGNOSIS — L70.0 ACNE VULGARIS: ICD-10-CM

## 2023-07-07 DIAGNOSIS — I49.8 SINUS ARRHYTHMIA: ICD-10-CM

## 2023-07-07 DIAGNOSIS — L30.9 ECZEMA, UNSPECIFIED TYPE: ICD-10-CM

## 2023-07-07 DIAGNOSIS — F90.2 ADHD (ATTENTION DEFICIT HYPERACTIVITY DISORDER), COMBINED TYPE: Primary | ICD-10-CM

## 2023-07-07 DIAGNOSIS — D50.9 IRON DEFICIENCY ANEMIA, UNSPECIFIED IRON DEFICIENCY ANEMIA TYPE: ICD-10-CM

## 2023-07-07 PROCEDURE — 99203 OFFICE O/P NEW LOW 30 MIN: CPT | Performed by: NURSE PRACTITIONER

## 2023-07-07 PROCEDURE — 93000 ELECTROCARDIOGRAM COMPLETE: CPT | Performed by: NURSE PRACTITIONER

## 2023-07-11 ASSESSMENT — ENCOUNTER SYMPTOMS
COUGH: 0
EYE PAIN: 0
SINUS PRESSURE: 0
APNEA: 0
COLOR CHANGE: 0
SHORTNESS OF BREATH: 0
VOMITING: 0
TROUBLE SWALLOWING: 0
NAUSEA: 0
DIARRHEA: 0
BLOOD IN STOOL: 0
ABDOMINAL PAIN: 0
FACIAL SWELLING: 0
BACK PAIN: 0
WHEEZING: 0
CONSTIPATION: 0
EYE DISCHARGE: 0
RECTAL PAIN: 0
CHOKING: 0
PHOTOPHOBIA: 0
SORE THROAT: 0
ANAL BLEEDING: 0
SINUS PAIN: 0
EYE REDNESS: 0